# Patient Record
Sex: FEMALE | URBAN - METROPOLITAN AREA
[De-identification: names, ages, dates, MRNs, and addresses within clinical notes are randomized per-mention and may not be internally consistent; named-entity substitution may affect disease eponyms.]

---

## 2018-01-04 ENCOUNTER — APPOINTMENT (RX ONLY)
Dept: URBAN - METROPOLITAN AREA CLINIC 114 | Facility: CLINIC | Age: 33
Setting detail: DERMATOLOGY
End: 2018-01-04

## 2018-01-04 VITALS — WEIGHT: 165 LBS | HEIGHT: 65 IN

## 2018-01-04 DIAGNOSIS — D485 NEOPLASM OF UNCERTAIN BEHAVIOR OF SKIN: ICD-10-CM

## 2018-01-04 PROBLEM — D23.9 OTHER BENIGN NEOPLASM OF SKIN, UNSPECIFIED: Status: ACTIVE | Noted: 2018-01-04

## 2018-01-04 PROBLEM — L21.9 SEBORRHEIC DERMATITIS, UNSPECIFIED: Status: ACTIVE | Noted: 2018-01-04

## 2018-01-04 PROBLEM — D48.5 NEOPLASM OF UNCERTAIN BEHAVIOR OF SKIN: Status: ACTIVE | Noted: 2018-01-04

## 2018-01-04 PROBLEM — L57.8 OTHER SKIN CHANGES DUE TO CHRONIC EXPOSURE TO NONIONIZING RADIATION: Status: ACTIVE | Noted: 2018-01-04

## 2018-01-04 PROCEDURE — ? BIOPSY BY SHAVE METHOD

## 2018-01-04 PROCEDURE — 11100: CPT

## 2018-01-04 PROCEDURE — ? ADDITIONAL NOTES

## 2018-01-04 ASSESSMENT — LOCATION DETAILED DESCRIPTION DERM: LOCATION DETAILED: RIGHT INFERIOR ANTERIOR NECK

## 2018-01-04 ASSESSMENT — LOCATION ZONE DERM: LOCATION ZONE: NECK

## 2018-01-04 ASSESSMENT — LOCATION SIMPLE DESCRIPTION DERM: LOCATION SIMPLE: RIGHT ANTERIOR NECK

## 2018-01-04 NOTE — HPI: EVALUATION OF SKIN LESION(S)
Hpi Title: Evaluation of a Skin Lesion
How Severe Are Your Spot(S)?: mild
Have Your Spot(S) Been Treated In The Past?: has been treated
When Was It Treated?: 10/17/2017

## 2018-01-04 NOTE — PROCEDURE: BIOPSY BY SHAVE METHOD
Biopsy Method: Dermablade
Detail Level: Detailed
Biopsy Type: H and E
Bill 07879 For Specimen Handling/Conveyance To Laboratory?: no
Dressing: bandage
Electrodesiccation Text: The wound bed was treated with electrodesiccation after the biopsy was performed.
Consent: Written consent was obtained and risks were reviewed including but not limited to scarring, infection, bleeding, scabbing, incomplete removal, nerve damage and allergy to anesthesia.
Cryotherapy Text: The wound bed was treated with cryotherapy after the biopsy was performed.
Additional Anesthesia Volume In Cc (Will Not Render If 0): 0
Post-Care Instructions: I reviewed with the patient in detail post-care instructions: If your area was covered with a Band-Aid, remove with your next bath and gently clean daily with soap and water. Do not use hydrogen peroxide. Apply a thin layer of Aquaphor/Vaseline and cover with a Band-Aid. The goal is to keep the wound moist to prevent a scab. It is normal to have a little redness 1/8 inch around the area; however, if increased redness, pain, swelling or discolored drainage occurs, call the office. If bleeding occurs, hold steady pressure for 10-15 minutes to stop it. Our office normally receives your results in 7-10 business days and will call or email you when we receive them. If you do not hear from us in two weeks, please call us.
Billing Type: Third-Party Bill
Anesthesia Volume In Cc: 0.5
Anesthesia Type: 1% lidocaine with epinephrine and a 1:10 solution of 8.4% sodium bicarbonate
Wound Care: Polysporin ointment
Size Of Lesion In Cm: 0.4
Render Post-Care Instructions In Note?: yes
Electrodesiccation And Curettage Text: The wound bed was treated with electrodesiccation and curettage after the biopsy was performed.
Type Of Destruction Used: Curettage
Notification Instructions: Patient will be notified of biopsy results. However, patient instructed to call the office if not contacted within 2 weeks.
Silver Nitrate Text: The wound bed was treated with silver nitrate after the biopsy was performed.
Curettage Text: The wound bed was treated with curettage after the biopsy was performed.
Hemostasis: Drysol

## 2018-05-31 ENCOUNTER — APPOINTMENT (RX ONLY)
Dept: URBAN - METROPOLITAN AREA CLINIC 114 | Facility: CLINIC | Age: 33
Setting detail: DERMATOLOGY
End: 2018-05-31

## 2018-05-31 VITALS — HEIGHT: 65 IN | WEIGHT: 165 LBS

## 2018-05-31 DIAGNOSIS — D22 MELANOCYTIC NEVI: ICD-10-CM

## 2018-05-31 PROBLEM — D22.4 MELANOCYTIC NEVI OF SCALP AND NECK: Status: ACTIVE | Noted: 2018-05-31

## 2018-05-31 PROBLEM — L57.8 OTHER SKIN CHANGES DUE TO CHRONIC EXPOSURE TO NONIONIZING RADIATION: Status: ACTIVE | Noted: 2018-05-31

## 2018-05-31 PROBLEM — D23.5 OTHER BENIGN NEOPLASM OF SKIN OF TRUNK: Status: ACTIVE | Noted: 2018-05-31

## 2018-05-31 PROCEDURE — ? ADDITIONAL NOTES

## 2018-05-31 PROCEDURE — 99024 POSTOP FOLLOW-UP VISIT: CPT

## 2018-05-31 PROCEDURE — ? COUNSELING

## 2018-05-31 ASSESSMENT — LOCATION DETAILED DESCRIPTION DERM: LOCATION DETAILED: LEFT SUPERIOR ANTERIOR NECK

## 2018-05-31 ASSESSMENT — LOCATION ZONE DERM: LOCATION ZONE: NECK

## 2018-05-31 ASSESSMENT — LOCATION SIMPLE DESCRIPTION DERM: LOCATION SIMPLE: LEFT ANTERIOR NECK

## 2018-06-07 ENCOUNTER — APPOINTMENT (RX ONLY)
Dept: URBAN - METROPOLITAN AREA CLINIC 114 | Facility: CLINIC | Age: 33
Setting detail: DERMATOLOGY
End: 2018-06-07

## 2018-06-07 VITALS — WEIGHT: 165 LBS | HEIGHT: 65 IN

## 2018-06-07 DIAGNOSIS — D485 NEOPLASM OF UNCERTAIN BEHAVIOR OF SKIN: ICD-10-CM

## 2018-06-07 PROBLEM — D48.5 NEOPLASM OF UNCERTAIN BEHAVIOR OF SKIN: Status: ACTIVE | Noted: 2018-06-07

## 2018-06-07 PROCEDURE — ? BIOPSY BY PUNCH METHOD

## 2018-06-07 PROCEDURE — 11100: CPT

## 2018-06-07 PROCEDURE — ? COUNSELING

## 2018-06-07 ASSESSMENT — LOCATION DETAILED DESCRIPTION DERM: LOCATION DETAILED: RIGHT INFERIOR LATERAL NECK

## 2018-06-07 ASSESSMENT — LOCATION SIMPLE DESCRIPTION DERM: LOCATION SIMPLE: RIGHT ANTERIOR NECK

## 2018-06-07 ASSESSMENT — LOCATION ZONE DERM: LOCATION ZONE: NECK

## 2018-06-07 NOTE — HPI: SKIN LESION
How Severe Is Your Skin Lesion?: moderate
treated_been_treated
Is This A New Presentation, Or A Follow-Up?: Mole

## 2018-06-07 NOTE — PROCEDURE: BIOPSY BY PUNCH METHOD
Anesthesia Type: 1% lidocaine with epinephrine and a 1:10 solution of 8.4% sodium bicarbonate
Anesthesia Volume In Cc: 1
Render Post-Care Instructions In Note?: yes
Punch Size In Mm: 6
Post-Care Instructions: I reviewed with the patient in detail post-care instructions: If your area was covered with a Band-Aid, remove with your next bath and gently clean daily with soap and water. Do not use hydrogen peroxide. Apply a thin layer of Aquaphor/Vaseline and cover with a Band-Aid. The goal is to keep the wound moist to prevent a scab. It is normal to have a little redness 1/8 inch around the area; however, if increased redness, pain, swelling or discolored drainage occurs, call the office. If bleeding occurs, hold steady pressure for 10-15 minutes to stop it. Our office normally receives your results in 7-10 business days and will call or email you when we receive them. If you do not hear from us in two weeks, please call us.
Epidermal Sutures: 5-0 Nylon
Hemostasis: None
Detail Level: Detailed
Patient Will Remove Sutures At Home?: No
X Size Of Lesion In Cm (Optional): 0
Wound Care: Polysporin ointment
Suture Removal: 7 days
Notification Instructions: Patient will be notified of biopsy results. However, patient instructed to call the office if not contacted within 2 weeks.
Dressing: bandage
Biopsy Type: H and E
Billing Type: Third-Party Bill
Home Suture Removal Text: Patient was provided a home suture removal kit and will remove their sutures at home.  If they have any questions or difficulties they will call the office.
Consent: Written consent was obtained and risks were reviewed including but not limited to scarring, infection, bleeding, scabbing, incomplete removal, nerve damage and allergy to anesthesia.

## 2018-06-14 ENCOUNTER — APPOINTMENT (RX ONLY)
Dept: URBAN - METROPOLITAN AREA CLINIC 114 | Facility: CLINIC | Age: 33
Setting detail: DERMATOLOGY
End: 2018-06-14

## 2018-06-14 VITALS — HEIGHT: 65 IN | WEIGHT: 165 LBS

## 2018-06-14 DIAGNOSIS — Z48.817 ENCOUNTER FOR SURGICAL AFTERCARE FOLLOWING SURGERY ON THE SKIN AND SUBCUTANEOUS TISSUE: ICD-10-CM

## 2018-06-14 PROBLEM — L57.8 OTHER SKIN CHANGES DUE TO CHRONIC EXPOSURE TO NONIONIZING RADIATION: Status: ACTIVE | Noted: 2018-06-14

## 2018-06-14 PROBLEM — D23.9 OTHER BENIGN NEOPLASM OF SKIN, UNSPECIFIED: Status: ACTIVE | Noted: 2018-06-14

## 2018-06-14 PROBLEM — D48.5 NEOPLASM OF UNCERTAIN BEHAVIOR OF SKIN: Status: ACTIVE | Noted: 2018-06-14

## 2018-06-14 PROCEDURE — ? PRESCRIPTION

## 2018-06-14 PROCEDURE — 99024 POSTOP FOLLOW-UP VISIT: CPT

## 2018-06-14 PROCEDURE — ? SUTURE REMOVAL (GLOBAL PERIOD)

## 2018-06-14 RX ORDER — MUPIROCIN 20 MG/G
OINTMENT TOPICAL
Qty: 1 | Refills: 0 | Status: ERX | COMMUNITY
Start: 2018-06-14

## 2018-06-14 RX ADMIN — MUPIROCIN: 20 OINTMENT TOPICAL at 14:45

## 2018-06-14 ASSESSMENT — LOCATION DETAILED DESCRIPTION DERM: LOCATION DETAILED: RIGHT SUPERIOR LATERAL NECK

## 2018-06-14 ASSESSMENT — LOCATION SIMPLE DESCRIPTION DERM: LOCATION SIMPLE: RIGHT ANTERIOR NECK

## 2018-06-14 ASSESSMENT — LOCATION ZONE DERM: LOCATION ZONE: NECK

## 2018-06-14 NOTE — PROCEDURE: SUTURE REMOVAL (GLOBAL PERIOD)
Add 13308 Cpt? (Important Note: In 2017 The Use Of 77897 Is Being Tracked By Cms To Determine Future Global Period Reimbursement For Global Periods): yes
Detail Level: Detailed